# Patient Record
Sex: MALE | Race: WHITE | Employment: FULL TIME | URBAN - METROPOLITAN AREA
[De-identification: names, ages, dates, MRNs, and addresses within clinical notes are randomized per-mention and may not be internally consistent; named-entity substitution may affect disease eponyms.]

---

## 2023-11-13 ENCOUNTER — OFFICE VISIT (OUTPATIENT)
Dept: PRIMARY CARE | Facility: CLINIC | Age: 38
End: 2023-11-13
Payer: COMMERCIAL

## 2023-11-13 VITALS
WEIGHT: 284 LBS | HEIGHT: 76 IN | DIASTOLIC BLOOD PRESSURE: 113 MMHG | TEMPERATURE: 97.8 F | HEART RATE: 98 BPM | OXYGEN SATURATION: 95 % | SYSTOLIC BLOOD PRESSURE: 163 MMHG | BODY MASS INDEX: 34.58 KG/M2

## 2023-11-13 DIAGNOSIS — Z00.00 ANNUAL PHYSICAL EXAM: Primary | ICD-10-CM

## 2023-11-13 DIAGNOSIS — I10 PRIMARY HYPERTENSION: ICD-10-CM

## 2023-11-13 DIAGNOSIS — E66.9 CLASS 1 OBESITY WITH SERIOUS COMORBIDITY AND BODY MASS INDEX (BMI) OF 34.0 TO 34.9 IN ADULT, UNSPECIFIED OBESITY TYPE: ICD-10-CM

## 2023-11-13 LAB
NON-UH HIE A/G RATIO: 1.3
NON-UH HIE ALB: 4.6 G/DL (ref 3.4–5)
NON-UH HIE ALK PHOS: 88 UNIT/L (ref 45–117)
NON-UH HIE BILIRUBIN, TOTAL: 1.3 MG/DL (ref 0.3–1.2)
NON-UH HIE BUN/CREAT RATIO: 10.9
NON-UH HIE BUN: 12 MG/DL (ref 9–23)
NON-UH HIE CALCIUM: 10.4 MG/DL (ref 8.7–10.4)
NON-UH HIE CALCULATED LDL CHOLESTEROL: 191 MG/DL (ref 60–130)
NON-UH HIE CALCULATED OSMOLALITY: 274 MOSM/KG (ref 275–295)
NON-UH HIE CHLORIDE: 102 MMOL/L (ref 98–107)
NON-UH HIE CHOLESTEROL: 271 MG/DL (ref 100–200)
NON-UH HIE CO2, VENOUS: 26 MMOL/L (ref 20–31)
NON-UH HIE CREATININE: 1.1 MG/DL (ref 0.6–1.1)
NON-UH HIE GFR AA: >60
NON-UH HIE GLOBULIN: 3.6 G/DL
NON-UH HIE GLOMERULAR FILTRATION RATE: >60 ML/MIN/1.73M?
NON-UH HIE GLUCOSE: 97 MG/DL (ref 74–106)
NON-UH HIE GOT: 79 UNIT/L (ref 15–37)
NON-UH HIE GPT: 149 UNIT/L (ref 10–49)
NON-UH HIE HCT: 48.4 % (ref 41–52)
NON-UH HIE HDL CHOLESTEROL: 62 MG/DL (ref 40–60)
NON-UH HIE HEPATITIS C ANTIBODY: NONREACTIVE
NON-UH HIE HGB A1C: 5 %
NON-UH HIE HGB: 16.7 G/DL (ref 13.5–17.5)
NON-UH HIE INSTR WBC ND: 9.1
NON-UH HIE K: 4.2 MMOL/L (ref 3.5–5.1)
NON-UH HIE MCH: 32.1 PG (ref 27–34)
NON-UH HIE MCHC: 34.5 G/DL (ref 32–37)
NON-UH HIE MCV: 93.2 FL (ref 80–100)
NON-UH HIE MPV: 8.3 FL (ref 7.4–10.4)
NON-UH HIE NA: 137 MMOL/L (ref 135–145)
NON-UH HIE PLATELET: 237 X10 (ref 150–450)
NON-UH HIE RBC: 5.19 X10 (ref 4.7–6.1)
NON-UH HIE RDW: 13 % (ref 11.5–14.5)
NON-UH HIE TOTAL CHOL/HDL CHOL RATIO: 4.4
NON-UH HIE TOTAL PROTEIN: 8.2 G/DL (ref 5.7–8.2)
NON-UH HIE TRIGLYCERIDES: 91 MG/DL (ref 30–150)
NON-UH HIE WBC: 9.1 X10 (ref 4.5–11)

## 2023-11-13 PROCEDURE — 99212 OFFICE O/P EST SF 10 MIN: CPT | Performed by: FAMILY MEDICINE

## 2023-11-13 PROCEDURE — 99386 PREV VISIT NEW AGE 40-64: CPT | Performed by: FAMILY MEDICINE

## 2023-11-13 PROCEDURE — 3077F SYST BP >= 140 MM HG: CPT | Performed by: FAMILY MEDICINE

## 2023-11-13 PROCEDURE — 3080F DIAST BP >= 90 MM HG: CPT | Performed by: FAMILY MEDICINE

## 2023-11-13 PROCEDURE — 3008F BODY MASS INDEX DOCD: CPT | Performed by: FAMILY MEDICINE

## 2023-11-13 PROCEDURE — 90715 TDAP VACCINE 7 YRS/> IM: CPT | Performed by: FAMILY MEDICINE

## 2023-11-13 PROCEDURE — 1036F TOBACCO NON-USER: CPT | Performed by: FAMILY MEDICINE

## 2023-11-13 PROCEDURE — 90471 IMMUNIZATION ADMIN: CPT | Performed by: FAMILY MEDICINE

## 2023-11-13 RX ORDER — LISINOPRIL 5 MG/1
5 TABLET ORAL DAILY
Qty: 30 TABLET | Refills: 11 | Status: SHIPPED | OUTPATIENT
Start: 2023-11-13 | End: 2023-12-13 | Stop reason: WASHOUT

## 2023-11-13 ASSESSMENT — PATIENT HEALTH QUESTIONNAIRE - PHQ9
2. FEELING DOWN, DEPRESSED OR HOPELESS: NOT AT ALL
1. LITTLE INTEREST OR PLEASURE IN DOING THINGS: NOT AT ALL
SUM OF ALL RESPONSES TO PHQ9 QUESTIONS 1 AND 2: 0

## 2023-11-13 ASSESSMENT — COLUMBIA-SUICIDE SEVERITY RATING SCALE - C-SSRS
6. HAVE YOU EVER DONE ANYTHING, STARTED TO DO ANYTHING, OR PREPARED TO DO ANYTHING TO END YOUR LIFE?: NO
2. HAVE YOU ACTUALLY HAD ANY THOUGHTS OF KILLING YOURSELF?: NO
1. IN THE PAST MONTH, HAVE YOU WISHED YOU WERE DEAD OR WISHED YOU COULD GO TO SLEEP AND NOT WAKE UP?: NO

## 2023-11-13 NOTE — PROGRESS NOTES
Subjective   Patient ID: Jalil Clay is a 38 y.o. male who presents for New Patient Visit (Pt is here to establish care. ).  Very pleasant 38-year-old here today to establish care and for wellness exam  He has no significant past medical history  No chest pain chest tightness shortness of breath no fever chills or night sweats no  problems  No recent hospital or ER visits  Family history of hypertension and hyperlipidemia  No angina palpitations or syncope no lateralizing weakness  His blood pressure is elevated today he is asymptomatic no headaches no chest discomfort no blurry or double vision no neck pain no swelling of the lower extremities  He does not take any supplements is on no regular medications and is not a smoker        Review of Systems  Constitutional: no chills, no fever and no night sweats.   Eyes: no blurred vision and no eyesight problems.   ENT: no hearing loss, no nasal congestion, no nasal discharge, no hoarseness and no sore throat.   Cardiovascular: no chest pain, no intermittent leg claudication, no lower extremity edema, no palpitations and no syncope.   Respiratory: no cough, no shortness of breath during exertion, no shortness of breath at rest and no wheezing.   Gastrointestinal: no abdominal pain, no blood in stools, no constipation, no diarrhea, no melena, no nausea, no rectal pain and no vomiting.   Genitourinary: no dysuria, no change in urinary frequency, no urinary hesitancy, no feelings of urinary urgency and no vaginal discharge.   Musculoskeletal: no arthralgias,  no back pain and no myalgias.   Integumentary: no new skin lesions and no rashes.   Neurological: no difficulty walking, no headache, no limb weakness, no numbness and no tingling.   Psychiatric: no anxiety, no depression, no anhedonia and no substance use disorders.   Endocrine: no recent weight gain and no recent weight loss.   Hematologic/Lymphatic: no tendency for easy bruising and no swollen glands  ".    Objective    BP (!) 163/113   Pulse 98   Temp 36.6 °C (97.8 °F)   Ht 1.93 m (6' 4\")   Wt 129 kg (284 lb)   SpO2 95%   BMI 34.57 kg/m²    Physical Exam  The patient appeared well nourished and normally developed. Vital signs as documented. Head exam is unremarkable. No scleral icterus or corneal arcus noted.  Pupils are equal round reactive to light extraocular movements are intact no hemorrhages noted on funduscopic exam mouth mucous membranes are moist no exudates ears canals clear TMs are gray pearly not injected nose no rhinorrhea or epistaxis Neck is without jugular venous distension, thyromegaly, or carotid bruits. Carotid upstrokes are brisk bilaterally. Lungs are clear to auscultation and percussion. Cardiac exam reveals the PMI to be normally sized and situated. Rhythm is regular. First and second heart sounds normal. No murmurs, rubs or gallops. Abdominal exam reveals normal bowel sounds, no masses, no organomegaly and no aortic enlargement. Extremities are nonedematous and both femoral and pedal pulses are normal.  Neurologic exam DTRs are equal bilaterally no focal deficits strength is symmetrical heme lymph no palpable lymph nodes in the neck axilla or groin    Assessment/Plan   Problem List Items Addressed This Visit       Annual physical exam - Primary    Relevant Orders    Comprehensive Metabolic Panel    Lipid Panel    Hemoglobin A1C    Hepatitis C antibody    CBC    Primary hypertension    Relevant Medications    lisinopril 5 mg tablet    Class 1 obesity with serious comorbidity and body mass index (BMI) of 34.0 to 34.9 in adult     Above normal BMI nutrition and physical activity reviewed          Blood pressure is not at goal  Begin lisinopril 5 mg  Recheck in office in 2 weeks  Annual wellness exam continue annual exams  Above normal BMI nutrition and physical activity reviewed       Miranda Owens MD  "

## 2023-11-30 PROBLEM — Z00.00 ANNUAL PHYSICAL EXAM: Status: ACTIVE | Noted: 2023-11-30

## 2023-11-30 PROBLEM — E66.9 CLASS 1 OBESITY WITH SERIOUS COMORBIDITY AND BODY MASS INDEX (BMI) OF 34.0 TO 34.9 IN ADULT: Status: ACTIVE | Noted: 2023-11-30

## 2023-11-30 PROBLEM — I10 PRIMARY HYPERTENSION: Status: ACTIVE | Noted: 2023-11-30

## 2023-12-13 ENCOUNTER — OFFICE VISIT (OUTPATIENT)
Dept: PRIMARY CARE | Facility: CLINIC | Age: 38
End: 2023-12-13
Payer: COMMERCIAL

## 2023-12-13 VITALS
DIASTOLIC BLOOD PRESSURE: 107 MMHG | BODY MASS INDEX: 34.58 KG/M2 | TEMPERATURE: 98.7 F | WEIGHT: 284 LBS | SYSTOLIC BLOOD PRESSURE: 151 MMHG | HEIGHT: 76 IN | HEART RATE: 92 BPM

## 2023-12-13 DIAGNOSIS — I10 PRIMARY HYPERTENSION: Primary | ICD-10-CM

## 2023-12-13 DIAGNOSIS — R74.8 ELEVATED LIVER ENZYMES: ICD-10-CM

## 2023-12-13 DIAGNOSIS — E78.00 PURE HYPERCHOLESTEROLEMIA: ICD-10-CM

## 2023-12-13 PROCEDURE — 99213 OFFICE O/P EST LOW 20 MIN: CPT | Performed by: FAMILY MEDICINE

## 2023-12-13 PROCEDURE — 3008F BODY MASS INDEX DOCD: CPT | Performed by: FAMILY MEDICINE

## 2023-12-13 PROCEDURE — 3077F SYST BP >= 140 MM HG: CPT | Performed by: FAMILY MEDICINE

## 2023-12-13 PROCEDURE — 3080F DIAST BP >= 90 MM HG: CPT | Performed by: FAMILY MEDICINE

## 2023-12-13 PROCEDURE — 1036F TOBACCO NON-USER: CPT | Performed by: FAMILY MEDICINE

## 2023-12-13 RX ORDER — LISINOPRIL AND HYDROCHLOROTHIAZIDE 10; 12.5 MG/1; MG/1
1 TABLET ORAL DAILY
Qty: 90 TABLET | Refills: 3 | Status: SHIPPED | OUTPATIENT
Start: 2023-12-13 | End: 2024-12-12

## 2023-12-13 RX ORDER — ATORVASTATIN CALCIUM 20 MG/1
20 TABLET, FILM COATED ORAL DAILY
Qty: 90 TABLET | Refills: 3 | Status: SHIPPED | OUTPATIENT
Start: 2023-12-13 | End: 2024-12-12

## 2023-12-13 ASSESSMENT — PATIENT HEALTH QUESTIONNAIRE - PHQ9
1. LITTLE INTEREST OR PLEASURE IN DOING THINGS: NOT AT ALL
SUM OF ALL RESPONSES TO PHQ9 QUESTIONS 1 AND 2: 0
2. FEELING DOWN, DEPRESSED OR HOPELESS: NOT AT ALL

## 2023-12-13 NOTE — PROGRESS NOTES
"Subjective   Patient ID: Jorje Clay \"Roman" is a 38 y.o. male who presents for Hypertension.  Very pleasant 38-year-old here for follow-up hypertension  Therapy with lisinopril was initiated blood pressure still not at goal  No side effects or issues no headache he is completely asymptomatic  In the course of his workup liver enzymes were found to be elevated no history of hepatitis no jaundice no nausea or vomiting also his cholesterol is extremely high he has family history of heart disease here to discuss that as well        Review of Systems  Constitutional: no chills, no fever and no night sweats.   Eyes: no blurred vision and no eyesight problems.   ENT: no hearing loss, no nasal congestion, no nasal discharge, no hoarseness and no sore throat.   Cardiovascular: no chest pain, no intermittent leg claudication, no lower extremity edema, no palpitations and no syncope.   Respiratory: no cough, no shortness of breath during exertion, no shortness of breath at rest and no wheezing.   Gastrointestinal: no abdominal pain, no blood in stools, no constipation, no diarrhea, no melena, no nausea, no rectal pain and no vomiting.   Genitourinary: no dysuria, no change in urinary frequency, no urinary hesitancy, no feelings of urinary urgency and no vaginal discharge.   Musculoskeletal: no arthralgias,  no back pain and no myalgias.   Integumentary: no new skin lesions and no rashes.   Neurological: no difficulty walking, no headache, no limb weakness, no numbness and no tingling.   Psychiatric: no anxiety, no depression, no anhedonia and no substance use disorders.   Endocrine: no recent weight gain and no recent weight loss.   Hematologic/Lymphatic: no tendency for easy bruising and no swollen glands .    Objective    BP (!) 151/107   Pulse 92   Temp 37.1 °C (98.7 °F)   Ht 1.93 m (6' 4\")   Wt 129 kg (284 lb)   BMI 34.57 kg/m²    Physical Exam  The patient appeared well nourished and normally developed. Vital " signs as documented. Head exam is unremarkable. No scleral icterus or corneal arcus noted.  Pupils are equal round reactive to light extraocular movements are intact no hemorrhages noted on funduscopic exam mouth mucous membranes are moist no exudates ears canals clear TMs are gray pearly not injected nose no rhinorrhea or epistaxis Neck is without jugular venous distension, thyromegaly, or carotid bruits. Carotid upstrokes are brisk bilaterally. Lungs are clear to auscultation and percussion. Cardiac exam reveals the PMI to be normally sized and situated. Rhythm is regular. First and second heart sounds normal. No murmurs, rubs or gallops. Abdominal exam reveals normal bowel sounds, no masses, no organomegaly and no aortic enlargement. Extremities are nonedematous and both femoral and pedal pulses are normal.  Neurologic exam DTRs are equal bilaterally no focal deficits strength is symmetrical heme lymph no palpable lymph nodes in the neck axilla or groin    Assessment/Plan   Problem List Items Addressed This Visit       Primary hypertension - Primary     Not at goal  Increase lisinopril to lisinopril hydrochlorothiazide 10/12.5  Recheck blood pressure in office in 2 weeks         Relevant Medications    lisinopriL-hydrochlorothiazide 10-12.5 mg tablet    Elevated liver enzymes     Most likely fatty infiltration of the liver  Check ultrasound of the biliary system  Healthy diet exercise and weight loss  Elevated liver enzymes due to fatty liver can be treated with weight loss  Continue to monitor         Relevant Orders    US biliary system    Pure hypercholesterolemia     Cholesterol not at goal  Family history of heart disease  Begin atorvastatin 20 mg  Recheck cholesterol in 3 months         Relevant Medications    atorvastatin (Lipitor) 20 mg tablet            Miranda Owens MD

## 2023-12-31 PROBLEM — E78.00 PURE HYPERCHOLESTEROLEMIA: Status: ACTIVE | Noted: 2023-12-31

## 2023-12-31 PROBLEM — R74.8 ELEVATED LIVER ENZYMES: Status: ACTIVE | Noted: 2023-12-31

## 2024-01-01 NOTE — ASSESSMENT & PLAN NOTE
Not at goal  Increase lisinopril to lisinopril hydrochlorothiazide 10/12.5  Recheck blood pressure in office in 2 weeks

## 2024-01-01 NOTE — ASSESSMENT & PLAN NOTE
Most likely fatty infiltration of the liver  Check ultrasound of the biliary system  Healthy diet exercise and weight loss  Elevated liver enzymes due to fatty liver can be treated with weight loss  Continue to monitor

## 2024-01-01 NOTE — ASSESSMENT & PLAN NOTE
Cholesterol not at goal  Family history of heart disease  Begin atorvastatin 20 mg  Recheck cholesterol in 3 months

## 2024-03-15 ENCOUNTER — APPOINTMENT (OUTPATIENT)
Dept: PRIMARY CARE | Facility: CLINIC | Age: 39
End: 2024-03-15
Payer: COMMERCIAL